# Patient Record
Sex: MALE | Race: BLACK OR AFRICAN AMERICAN | NOT HISPANIC OR LATINO | Employment: UNEMPLOYED | URBAN - METROPOLITAN AREA
[De-identification: names, ages, dates, MRNs, and addresses within clinical notes are randomized per-mention and may not be internally consistent; named-entity substitution may affect disease eponyms.]

---

## 2022-03-10 ENCOUNTER — HOSPITAL ENCOUNTER (OUTPATIENT)
Facility: CLINIC | Age: 47
Setting detail: OBSERVATION
Discharge: NURSING, REHAB OR LONG-TERM CARE | End: 2022-03-11
Attending: EMERGENCY MEDICINE | Admitting: EMERGENCY MEDICINE
Payer: MEDICAID

## 2022-03-10 VITALS
RESPIRATION RATE: 16 BRPM | TEMPERATURE: 98.6 F | BODY MASS INDEX: 27.89 KG/M2 | SYSTOLIC BLOOD PRESSURE: 135 MMHG | DIASTOLIC BLOOD PRESSURE: 80 MMHG | OXYGEN SATURATION: 98 % | WEIGHT: 167.4 LBS | HEIGHT: 65 IN | HEART RATE: 70 BPM

## 2022-03-10 DIAGNOSIS — F11.10 OPIOID ABUSE (H): ICD-10-CM

## 2022-03-10 DIAGNOSIS — F32.A DEPRESSION, UNSPECIFIED DEPRESSION TYPE: ICD-10-CM

## 2022-03-10 DIAGNOSIS — R45.851 SUICIDAL IDEATION: ICD-10-CM

## 2022-03-10 DIAGNOSIS — F15.90 STIMULANT USE DISORDER: ICD-10-CM

## 2022-03-10 DIAGNOSIS — F19.10 SUBSTANCE ABUSE (H): ICD-10-CM

## 2022-03-10 LAB
ALBUMIN SERPL-MCNC: 3.4 G/DL (ref 3.4–5)
ALP SERPL-CCNC: 89 U/L (ref 40–150)
ALT SERPL W P-5'-P-CCNC: 16 U/L (ref 0–70)
ANION GAP SERPL CALCULATED.3IONS-SCNC: 8 MMOL/L (ref 3–14)
APAP SERPL-MCNC: <2 MG/L (ref 10–30)
AST SERPL W P-5'-P-CCNC: 22 U/L (ref 0–45)
BASOPHILS # BLD AUTO: 0.1 10E3/UL (ref 0–0.2)
BASOPHILS NFR BLD AUTO: 1 %
BILIRUB SERPL-MCNC: 0.3 MG/DL (ref 0.2–1.3)
BUN SERPL-MCNC: 8 MG/DL (ref 7–30)
CALCIUM SERPL-MCNC: 8.5 MG/DL (ref 8.5–10.1)
CHLORIDE BLD-SCNC: 100 MMOL/L (ref 94–109)
CO2 SERPL-SCNC: 29 MMOL/L (ref 20–32)
CREAT SERPL-MCNC: 0.94 MG/DL (ref 0.66–1.25)
EOSINOPHIL # BLD AUTO: 0.3 10E3/UL (ref 0–0.7)
EOSINOPHIL NFR BLD AUTO: 3 %
ERYTHROCYTE [DISTWIDTH] IN BLOOD BY AUTOMATED COUNT: 14.1 % (ref 10–15)
ETHANOL SERPL-MCNC: <0.01 G/DL
GFR SERPL CREATININE-BSD FRML MDRD: >90 ML/MIN/1.73M2
GLUCOSE BLD-MCNC: 131 MG/DL (ref 70–99)
GLUCOSE BLDC GLUCOMTR-MCNC: 172 MG/DL (ref 70–99)
HCT VFR BLD AUTO: 34.1 % (ref 40–53)
HGB BLD-MCNC: 11.4 G/DL (ref 13.3–17.7)
HOLD SPECIMEN: NORMAL
IMM GRANULOCYTES # BLD: 0 10E3/UL
IMM GRANULOCYTES NFR BLD: 0 %
LACTATE SERPL-SCNC: 0.9 MMOL/L (ref 0.7–2)
LYMPHOCYTES # BLD AUTO: 2.8 10E3/UL (ref 0.8–5.3)
LYMPHOCYTES NFR BLD AUTO: 29 %
MCH RBC QN AUTO: 27.3 PG (ref 26.5–33)
MCHC RBC AUTO-ENTMCNC: 33.4 G/DL (ref 31.5–36.5)
MCV RBC AUTO: 82 FL (ref 78–100)
MONOCYTES # BLD AUTO: 0.8 10E3/UL (ref 0–1.3)
MONOCYTES NFR BLD AUTO: 8 %
NEUTROPHILS # BLD AUTO: 5.5 10E3/UL (ref 1.6–8.3)
NEUTROPHILS NFR BLD AUTO: 59 %
NRBC # BLD AUTO: 0 10E3/UL
NRBC BLD AUTO-RTO: 0 /100
PLATELET # BLD AUTO: 328 10E3/UL (ref 150–450)
POTASSIUM BLD-SCNC: 3.5 MMOL/L (ref 3.4–5.3)
PROT SERPL-MCNC: 7.5 G/DL (ref 6.8–8.8)
RBC # BLD AUTO: 4.18 10E6/UL (ref 4.4–5.9)
SARS-COV-2 RNA RESP QL NAA+PROBE: NEGATIVE
SODIUM SERPL-SCNC: 137 MMOL/L (ref 133–144)
WBC # BLD AUTO: 9.5 10E3/UL (ref 4–11)

## 2022-03-10 PROCEDURE — 85025 COMPLETE CBC W/AUTO DIFF WBC: CPT | Performed by: EMERGENCY MEDICINE

## 2022-03-10 PROCEDURE — 80143 DRUG ASSAY ACETAMINOPHEN: CPT | Performed by: EMERGENCY MEDICINE

## 2022-03-10 PROCEDURE — C9803 HOPD COVID-19 SPEC COLLECT: HCPCS

## 2022-03-10 PROCEDURE — 83605 ASSAY OF LACTIC ACID: CPT | Performed by: EMERGENCY MEDICINE

## 2022-03-10 PROCEDURE — 96360 HYDRATION IV INFUSION INIT: CPT

## 2022-03-10 PROCEDURE — 99220 PR INITIAL OBSERVATION CARE,LEVEL III: CPT | Performed by: NURSE PRACTITIONER

## 2022-03-10 PROCEDURE — 36415 COLL VENOUS BLD VENIPUNCTURE: CPT | Performed by: EMERGENCY MEDICINE

## 2022-03-10 PROCEDURE — 99285 EMERGENCY DEPT VISIT HI MDM: CPT | Mod: 25

## 2022-03-10 PROCEDURE — 80053 COMPREHEN METABOLIC PANEL: CPT | Performed by: EMERGENCY MEDICINE

## 2022-03-10 PROCEDURE — 87635 SARS-COV-2 COVID-19 AMP PRB: CPT | Performed by: EMERGENCY MEDICINE

## 2022-03-10 PROCEDURE — 82077 ASSAY SPEC XCP UR&BREATH IA: CPT | Performed by: EMERGENCY MEDICINE

## 2022-03-10 PROCEDURE — 90791 PSYCH DIAGNOSTIC EVALUATION: CPT

## 2022-03-10 PROCEDURE — 258N000003 HC RX IP 258 OP 636: Performed by: EMERGENCY MEDICINE

## 2022-03-10 PROCEDURE — G0378 HOSPITAL OBSERVATION PER HR: HCPCS

## 2022-03-10 RX ORDER — QUETIAPINE FUMARATE 50 MG/1
50 TABLET, FILM COATED ORAL 3 TIMES DAILY PRN
Status: DISCONTINUED | OUTPATIENT
Start: 2022-03-10 | End: 2022-03-11 | Stop reason: HOSPADM

## 2022-03-10 RX ADMIN — SODIUM CHLORIDE 500 ML: 9 INJECTION, SOLUTION INTRAVENOUS at 02:11

## 2022-03-10 ASSESSMENT — ENCOUNTER SYMPTOMS
CHILLS: 0
DIARRHEA: 0
FEVER: 0
CONFUSION: 1
SEIZURES: 1
VOMITING: 0
HEADACHES: 0

## 2022-03-10 NOTE — TREATMENT PLAN
"  EmPATH Treatment Plan    Client's Name: Jason Echols IV  YOB: 1975    DSM-5 Diagnoses:     311 (F32.9) Unspecified Depressive Disorder  - provisional      300.00 (F41.9) Unspecified Anxiety Disorder - provisional        Psychosocial / Contextual Factors: Patient presented to the emPATH unit with the following concerns: Patient reports yesterday he had a seizure, he states he has a history of seizures.  Patient states he is currently homeless, he said he recently left  treatment at [facility].  Patient reports he recently took 2 blue pills which he thinks were percocet.  Patient reports he has a long history of homelessness and polysubstance use, primarily meth.  Patient said he's been feeling sad and lonely with SI for the last 2 days, \"I don't want to be on this earth anymore, I'm tired of my life, I thought of jumping in front of the train\".    Anticipated number of sessions or this episode of care: 1-4    MeasurableTreatment Goal(s) related to diagnosis / functional impairment(s)    Goal: Stabilize the suicidal crisis?      Objective: Identify life factors/triggers that preceded the SI?      Patient will: express feelings related to SI in order to explore factors/triggers?      ?LMHP will: assist patient in becoming aware of life factors and triggers that may have been?      ?precursors to SI?      Objective: participate in therapy session around emotional issues resulting in suicidal thoughts?      ?Patient will: discuss feelings and emotional sources of stress, hopelessness?      ?LMHP will: encourage pt to express feelings and emotions?           Goal : Patient will engage in lethal means restriction       Objective #A        Patient will identify risk area and items in their home associate with suicidal risk and develop a plan to remove/restrict access       Intervention(s)       LMHP will facilitate a guided discussion with patient to help identify risk and promote safety  "       Objective #B       Patient will include people in their support system in their safety planning       Intervention(s)       LMHP will facilitate a safety planning session collaboratively with patient and members of their support system. This will include discussion on how to make the home safer.     Goal: Patient will increase awareness of depression symptoms and their impact on functioning and develop skills to reduce negative impact.      Objective #A       Patient will describe thoughts, feelings, and actions associated with depression.       Intervention(s)      LMHP will explore and process with patient how depression has impacted them.      Objective #B      Patient will increase depression coping skills.      Intervention(s)      LMHP will teach CBT skills and model their use.                 Appearance:   Appropriate    Eye Contact:   Fair    Psychomotor Behavior: Normal    Attitude:   Cooperative    Orientation:   All   Speech    Rate / Production: Normal/ Responsive Normal     Volume:  Normal    Mood:    Anxious  Depressed  Sad    Affect:    Appropriate  Flat    Thought Content:  Suicidal   Thought Form:  Coherent    Insight:    Fair           PLAN:   Patient will board in emPATH until patient and treatment deem it appropriate to either discharge or admit to a higher level of care.       Sharmaine [place], [place]                                                           ________

## 2022-03-10 NOTE — ED TRIAGE NOTES
"BIBA from [facility], pt approached individual and reported that he had a seizure. Pt reports hx of seizures. Patient provided different name to medics and ER staff. Pt also reports \"bad thoughts\" and taking \"2 blue pills\" [place] a [place] on the train. VSS for EMS. .  "

## 2022-03-10 NOTE — ED NOTES
Pt states he currently is diagnosed with DM. Pt denies any medications or management of his DM and states he does not need anything at this time.

## 2022-03-10 NOTE — ED NOTES
"Pt brought to Em[facility] with increased thoughts of SI and feeling more depressed. Pt states he is \"tired of being here and tired of life.\" Pt is feeling more depressed and states he is willing to talk to someone about his ongoing depression and restart on some medications. Pt doesn't recall how he came to the hospital or the events leading up to his hospitalization. Pt is feeling more depressed and does stated he feels suicidal. Pt is unable to expand upon this as he remain recluse and not forthcoming with much information. Pt does state he is DM however only takes insulin when ever he can. Pt also states he has been off his medications for over a month. Per ED staff pt also has a SZR hx and had an unwitnessed SZR yesterday. Pt does not recall this but states he has had SZRs in the past. Pt does not remember when his last SZR was prior to what was said to staff yesterday. Pt is not on any medications for SZRs. Pt is flat and depressed. Pt has poor eye contact and is not forthcoming with much information. Pt is agreeable to meet with staff and MD to talk about a plan. Nursing was not able to get an accurate BG due to pt eating breakfast up arrival to Em[facility]. Nursing to recheck BG this afternoon.  BG upon admission to ED was 131.  Nursing and risk assessments completed.  Assessments reviewed with LMHP and physician. Video monitoring in progress, patient informed.  Admission information reviewed with patient. Patient given a tour of Em[facility] and instructions on using the facility. Questions regarding [facility] addressed. Pt search completed and belongings inventoried.  "

## 2022-03-10 NOTE — ED PROVIDER NOTES
"  History   Chief Complaint:  Seizures       HPI   Jason Echols IV is a 46 year old male with history of epilepsy who presents after a seizure. Per EMS report, the patient walked up to the security desk at [facility] and stated that he had a seizure. He reports taking two blue pills from a stranger before he arrived at [facility]. He is now feeling weak and reports suicidal ideation with a plan. He denies fever, chills, vomiting, diarrhea or headache. He did have alcohol tonight.     Review of Systems   Constitutional: Negative for chills and fever.   Gastrointestinal: Negative for diarrhea and vomiting.   Neurological: Positive for seizures. Negative for headaches.   Psychiatric/Behavioral: Positive for confusion and suicidal ideas.   All other systems reviewed and are negative.      Allergies:  No Known Drug Allergies    Medications:  Zithromax    Past Medical History:     Epilepsy    Social History:  Presents alone  Smoker    Physical Exam     Patient Vitals for the past 24 hrs:   BP Temp Temp src Pulse Resp SpO2 Height Weight   03/10/22 0500 112/69 -- -- 90 -- 94 % -- --   03/10/22 0340 123/81 -- -- 93 20 93 % -- --   03/10/22 0215 -- -- -- 89 20 96 % -- --   03/10/22 0201 (!) 134/91 98.5  F (36.9  C) Oral 88 16 98 % 1.651 m (5' 5\") 81.6 kg (180 lb)       Physical Exam  Constitutional: Mildly drowsy, GCS 1 14  HENT:    Nose: Nose normal.    Mouth/Throat: Oropharynx is clear, mucous membranes are moist  Eyes: Pupils are constricted, mildly injected sclera  CV: regular rate and rhythm; no murmurs  Chest: Effort normal and breath sounds clear without wheezing or rales, symmetric bilaterally   GI:  non tender. No distension. No guarding or rebound.    MSK: No LE edema, no tenderness to palpation of BLE.  Neurological: Alert, attentive, moving all extremities equally.   Skin: Skin is warm and dry.        Emergency Department Course     Laboratory:  Labs Ordered and Resulted from Time of ED Arrival to Time of ED Departure "   COMPREHENSIVE METABOLIC PANEL - Abnormal       Result Value    Sodium 137      Potassium 3.5      Chloride 100      Carbon Dioxide (CO2) 29      Anion Gap 8      Urea Nitrogen 8      Creatinine 0.94      Calcium 8.5      Glucose 131 (*)     Alkaline Phosphatase 89      AST 22      ALT 16      Protein Total 7.5      Albumin 3.4      Bilirubin Total 0.3      GFR Estimate >90     CBC WITH PLATELETS AND DIFFERENTIAL - Abnormal    WBC Count 9.5      RBC Count 4.18 (*)     Hemoglobin 11.4 (*)     Hematocrit 34.1 (*)     MCV 82      MCH 27.3      MCHC 33.4      RDW 14.1      Platelet Count 328      % Neutrophils 59      % Lymphocytes 29      % Monocytes 8      % Eosinophils 3      % Basophils 1      % Immature Granulocytes 0      NRBCs per 100 WBC 0      Absolute Neutrophils 5.5      Absolute Lymphocytes 2.8      Absolute Monocytes 0.8      Absolute Eosinophils 0.3      Absolute Basophils 0.1      Absolute Immature Granulocytes 0.0      Absolute NRBCs 0.0     ACETAMINOPHEN LEVEL - Abnormal    Acetaminophen <2 (*)    ETHYL ALCOHOL LEVEL - Normal    Alcohol ethyl <0.01     LACTIC ACID WHOLE BLOOD - Normal    Lactic Acid 0.9     COVID-19 VIRUS (CORONAVIRUS) BY PCR - Normal    SARS CoV2 PCR Negative            Emergency Department Course:    Mental Health Risk Assessment      PSS-3    Date and Time Over the past 2 weeks have you felt down, depressed, or hopeless? Over the past 2 weeks have you had thoughts of killing yourself? Have you ever attempted to kill yourself? When did this last happen? User   03/10/22 0206 yes yes yes --       C-SSRS ([place])    Date and Time Q1 Wished to be Dead (Past Month) Q2 Suicidal Thoughts (Past Month) Q3 Suicidal Thought Method Q4 Suicidal Intent without Specific Plan Q5 Suicide Intent with Specific Plan Q6 Suicide Behavior (Lifetime) Within the Past 3 Months? RETIRED: Level of Risk per Screen Screening Not Complete User   03/10/22 0206 yes yes -- yes yes yes -- -- -- SH     "          Suicide assessment completed by mental health (D.E.C., LCSW, etc.)    Reviewed:  I reviewed nursing notes, vitals, past medical history and Care Everywhere    Assessments:  215 I obtained history and examined the patient as noted above.   530 I rechecked the patient..      Interventions:  211: NS 1L IV Bolus      Disposition:  The patient was transferred to [facility].     Impression & Plan     Medical Decision Makin-year-old male past medical history significant for substance abuse and question of possible epilepsy though I cannot find any documented in care everywhere presenting for evaluation of possible seizure.  He reportedly walked up to some of the [email or web address] reportedly had a seizure and was brought here to the emergency department.  He endorses taking 2 \"blue pills\" that he thought was oxycodone, denies any ethanol use.  On exam, he appears mildly drowsy with pinpoint pupils but is breathing on his own.  Blood ethanol level was negative.  He has no signs of trauma.  No indication for neuroimaging.  I have lower suspicion for seizure based on his story, he does not seem postictal and seems more sedate secondary to opiate abuse.  He did endorse to nursing and to medics that he is having suicidal thoughts with a plan to jump in front of train.  He was monitored here, did not require any Narcan, he cleared as expected the point he has steady gait and was transferred to [facility] for further evaluation of suicidal ideation.  He was maintained on KAYLIE hold.    Diagnosis:    ICD-10-CM    1. Substance abuse (H)  F19.10    2. Suicidal ideation  R45.851        Tin Ely MD  [employer]  4:28 AM 03/10/22       Scribe Disclosure:  I, Stacia Whitehead, am serving as a scribe at 2:11 AM on 3/10/2022 to document services personally performed by Tin Ely MD  based on my observations and the provider's statements to me.           Tin Ely, " MD  03/10/22 0650

## 2022-03-11 LAB
AMPHETAMINES UR QL SCN: ABNORMAL
BARBITURATES UR QL: ABNORMAL
BENZODIAZ UR QL: ABNORMAL
CANNABINOIDS UR QL SCN: ABNORMAL
COCAINE UR QL: ABNORMAL
OPIATES UR QL SCN: ABNORMAL
PCP UR QL SCN: ABNORMAL

## 2022-03-11 PROCEDURE — 80307 DRUG TEST PRSMV CHEM ANLYZR: CPT | Performed by: NURSE PRACTITIONER

## 2022-03-11 PROCEDURE — 99217 PR OBSERVATION CARE DISCHARGE: CPT | Performed by: PSYCHIATRY & NEUROLOGY

## 2022-03-11 PROCEDURE — G0378 HOSPITAL OBSERVATION PER HR: HCPCS

## 2022-03-11 NOTE — ED NOTES
[employer] transportation has been set up for a pick-up time of 1830. Will continue to monitor until then.

## 2022-03-11 NOTE — CONSULTS
3/11/2022    CD consult completed.  Pt was minimally engaged in evaluation, provided very little detail. Pt did not seem interested in attending [facility] treatment.   A list of options for pt was emailed to Sharmaine who is working with pt.     Recommendations:   1. Abstain from all non-prescribed mood-altering substances  2. Take all medications as prescribed  3. Enter and complete a co-occurring residential or IOP with lodging treatment program  4. Follow all recommendations upon discharge from treatment. Recommendations may include, but are not limited to: extended treatment, outpatient treatment and/or sober housing.  5. Follow all recommendations of your medical and mental health providers     Clinical Substantiation:  Patient is homeless and lacks a sober living environment. Patient lacks sober coping skills, lacks a sober peer support network, has dual issues of mental health and substance abuse. Patient's mental health issues are exacerbated by his substance use.     Referrals/Alternatives:  [facility]   Phone: [phone]  Fax: [phone]   [address] [place] [address] [address]          [place] [address]         [address] [address]   Phone: [phone]   Fax: [phone]      Pending sale to [facility] Team for Referrals  Phone: [phone]  Fax: [phone]  [facility]  Address : [address], [place] [address]  Phone : [phone]  Fax : [phone]     [facility] (IOP with lodging)  Phone: [phone]  Fax: [phone] [address] [place] [address] [address]  [place] [address]  [address] [facility] [address]  [place] [address]     [facility]   Phone: [phone]   Fax: [phone]     [facility]  Phone: [phone]  Fax: [phone]  [facility]  [address] [place] [address]        [facility] consult completed by: KIRILL Moralez.  Phone  Number: [phone]  E-mail Address: [email or web address] Mental [facility] and Addiction Services Evaluation Department  [address] [place] [address]     *Due to regulation of Title 42 of the Code of Federal Regulations (CFR) Part 2: Confidentiality laws apply to this note and the information wherein.  Thus, this note cannot be copy and pasted into any other health care staff's note nor can it be included in general medical records sent to ANY outside agency without the patient's written consent.

## 2022-03-11 NOTE — ED PROVIDER NOTES
"[facility] Unit - Initial Psychiatric Observation Note  [facility] Emergency Department  Observation Initiation Date: Mar 10, 2022    Jason Echols IV MRN: [id number]   Age: 46 year old YOB: 1975     History     Chief Complaint   Patient presents with     Seizures     HPI  Jason Echols IV is a 46 year old male with a past history notable for polysubstance abuse, diabetes and epilepsy who presented to the ED after reporting to staff at the [facility] that he had a seizure.  He reported to ED staff he was depressed and suicidal. He was medically cleared in the ED without concern for seizure activity and transferred to [facility] for psychiatric assessment.    On examination, patient is sleeping soundly.  He is difficult to keep awake long enough to engage in a meaningful conversation.  He appears to be detoxing from substances, although he does not specifiy from what. He told ED staff he tool two \"blue pills\" that he thought was oxycodone.  Patient becomes irritable the more staff try to engage with him.  He verbalizes frustration with the healthcare system recommending CD treatment when that has not helped him in the past.  He comments how he would rather \"jump in front of a bus.\"  He denies taking any prescription drugs for mental health or medical issues.  He is eating and taking in fluids.  He walks to the bathroom and falls back to sleep once he gets back to his chair.    Past Medical History  No past medical history on file.  No past surgical history on file.  BUPROPION HCL PO  IBUPROFEN PO  trimethoprim-polymyxin b (POLYTRIM) ophthalmic solution      No Known Allergies  Family History  No family history on file.  Social History   Social History     Tobacco Use     Smoking status: Current Every Day Smoker     Smokeless tobacco: Not on file   Substance Use Topics     Alcohol use: Yes     Drug use: No      Past medical history, past surgical history, medications, allergies, family history, and social " "history were reviewed with the patient. No additional pertinent items.       Review of Systems  A complete review of systems was performed with pertinent positives and negatives noted in the HPI, and all other systems negative.    Physical Examination   BP: (!) 134/91  Pulse: 88  Temp: 98.5  F (36.9  C)  Resp: 16  Height: 165.1 cm (5' 5\")  Weight: 81.6 kg (180 lb)  SpO2: 98 %    Physical Exam  General: Appears stated age.   Neuro: Alert and fully oriented. Extremities appear to demonstrate normal strength on visual inspection.   Integumentary/Skin: no rash visualized, normal color    Psychiatric Examination   Appearance: somnolent  Attitude:  uncooperative and irritable  Eye Contact:  poor   Mood:  depressed  Affect:  guarded  Speech:  mumbling  Psychomotor Behavior:  no evidence of tardive dyskinesia, dystonia, or tics and physical retardation  Thought Process:  logical  Associations:  no loose associations  Thought Content:  no evidence of psychotic thought and passive suicidal ideation present  Insight:  fair  Judgement:  fair  Oriented to:  time, person, and place  Attention Span and Concentration:  fair  Recent and Remote Memory:  fair  Language: able to name/identify objects without impairment  Fund of Knowledge: intact with awareness of current and past events    ED Course        Labs Ordered and Resulted from Time of ED Arrival to Time of ED Departure   COMPREHENSIVE METABOLIC PANEL - Abnormal       Result Value    Sodium 137      Potassium 3.5      Chloride 100      Carbon Dioxide (CO2) 29      Anion Gap 8      Urea Nitrogen 8      Creatinine 0.94      Calcium 8.5      Glucose 131 (*)     Alkaline Phosphatase 89      AST 22      ALT 16      Protein Total 7.5      Albumin 3.4      Bilirubin Total 0.3      GFR Estimate >90     CBC WITH PLATELETS AND DIFFERENTIAL - Abnormal    WBC Count 9.5      RBC Count 4.18 (*)     Hemoglobin 11.4 (*)     Hematocrit 34.1 (*)     MCV 82      MCH 27.3      MCHC 33.4      RDW " 14.1      Platelet Count 328      % Neutrophils 59      % Lymphocytes 29      % Monocytes 8      % Eosinophils 3      % Basophils 1      % Immature Granulocytes 0      NRBCs per 100 WBC 0      Absolute Neutrophils 5.5      Absolute Lymphocytes 2.8      Absolute Monocytes 0.8      Absolute Eosinophils 0.3      Absolute Basophils 0.1      Absolute Immature Granulocytes 0.0      Absolute NRBCs 0.0     ACETAMINOPHEN LEVEL - Abnormal    Acetaminophen <2 (*)    GLUCOSE BY METER - Abnormal    GLUCOSE BY METER POCT 172 (*)    ETHYL ALCOHOL LEVEL - Normal    Alcohol ethyl <0.01     LACTIC ACID WHOLE BLOOD - Normal    Lactic Acid 0.9     COVID-19 VIRUS (CORONAVIRUS) BY PCR - Normal    SARS CoV2 PCR Negative         Assessments & Plan (with Medical Decision Making)   Patient presenting with suicidal ideation, heightened depression in the context of chronic polysubstance abuse. Patient recently left a CD program and is not compliant with treatment recommendations.    Nursing notes reviewed noting no acute issues.     I have reviewed the assessment completed by the [facility].     During the observation period, the patient did not require medications for agitation, and did not require restraints/seclusion for patient and/or provider safety.     The patient was found to have a psychiatric condition that would benefit from an observation stay in the emergency department for further psychiatric stabilization and/or coordination of a safe disposition. The observation plan includes serial assessments of psychiatric condition, potential administration of medications if indicated, further disposition pending the patient's psychiatric course during the monitoring period.     Preliminary diagnosis:    ICD-10-CM    1. Substance abuse (H)  F19.10    2. Suicidal ideation  R45.851    3. Opioid abuse (H)  F11.10    4. Depression, unspecified depression type  F32.A         Treatment Plan:  -Observation status for continued symptoms monitoring.     -Will have available Seroquel 50 mg TID PRN for anxiety or sleep.  -Urine toxicology ordered.  -Patient is not interested in CD assessment or treatment.  -Reassess tomorrow with probable discharge to crisis, detox or homeless shelter.    --  AURA Wolfe CNP   [facility] EMERGENCY DEPT  EmPATH Unit  3/10/2022        Zahraa Bernardo APRN CNP  03/10/22 3617

## 2022-03-11 NOTE — ED NOTES
"Patient completed CD assessment. He walked out of the consultation room stating: \"all they want to do is send me to treatment.\" Plan is for patient to discharge to [facility] by 1900.  "
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
## 2022-03-11 NOTE — ED NOTES
"Patient remains irritable in mood. He spent the majority of the morning resting. States he feels \"much the same.\" Minimal interactions with staff and peers. He was observed helping himself to multiple courtesy meals in addition to his regular meal. Nursing will continue to monitor for safety.   "

## 2022-03-11 NOTE — DISCHARGE INSTRUCTIONS
Aftercare Plan  If I am feeling unsafe or I am in a crisis, I will:   Contact my established care providers   Call the National Suicide Prevention Lifeline: 531.578.7425   Go to the nearest emergency room   Call 344      Warning signs that I or other people might notice when a crisis is developing for me: having thoughts of harming myself or others, increased anxiety and depression      Things I am able to do on my own to cope or help me feel better: practice deep breathing exercises, practice grounding techniques, attend all appointments, take a break, go for a walk, write in journal       Things that I am able to do with others to cope or help me feel better: call supportive friends and family to talk and ask for help     Things I can use or do for distraction: take a break, go for a walk, write in a journal, take a warm bath/shower, watch TV      Changes I can make to support my mental health and wellness:   - Get a minimum of 30 minutes of self-care daily. This can be as simply as taking a shower, going for a walk, cooking a meal, read, writing, etc.   - Exercise 3-4 times weekly   - Start a gratitude journal and write down 3 things every day that you are grateful for. There are also gratitude journal apps for your smartphone that you can download for free if using your phone.   - Download a meditation meaghan and spend 15-20 minutes per day mediating/relaxing. Some apps to download include: Calm, Headspace and Insight Timer. All 3 of these apps have a free version.     People in my life that I can ask for help: family, friends      Your [facility] has a mental health crisis team you can call 24/7: [facility] Mobile Crisis  [phone] (adults)  [phone] (children)     Other things that are important when I m in crisis: Remember help is available, follow up with established providers, attend all appointments, take medications as prescribed        Additional resources and information: Behavioral Healthcare  "Providers (B[facility]) Coordinators to follow up with you with 1-2 days to ensure you have all of the resources that you need. You may also call Behavioral Healthcare Providers (BHP) Coordinators at [phone] if you have any questions or if any additional resources are needed and one of our coordinators can assist you.       Crisis Lines  Crisis Text Line  Text 030210  You will be connected with a trained live crisis counselor to provide support.     Por espanol, texto  SPEEDY a 853601 o texto a 442-AYUDAME en WhatsApp     National Hope Line  1.800.SUICIDE [3688417]        Community Resources  Fast Tracker  Linking people to mental health and substance use disorder resources  Movatun.org      [place] [facility] Warm Line  Peer to peer support  Monday thru Saturday, 12 pm to 10 pm  869.945.4214 or 3.993.835.5005  Text \"Support\" to [address]120     National [place] on Mental Illness (JOYCE)  597.515.6214 or 1.888.JOYCE.HELPS           Mental Health Apps  My3  https://Fast Track Asiapp.org/     VirtualHopeBox  https://Deposco.org/apps/virtual-hope-box/    [facility]    Phone: [phone]    Fax: [phone]    [address] [place] [address] [address]            [place] [address]             [address] [address]    Phone: [phone]    Fax: [phone]         [facility] Team for Referrals    Phone: [phone]    Fax: [phone]    [facility]    Address : [address], [place] [address]    Phone : [phone]    Fax : [phone]         [facility] (IOP with lodging)    Phone: [phone]    Fax: [phone] [address]    [place] [address]    [address]    [place] [address]         [facility]    Phone: [phone]    Fax: [phone]         [facility]    Phone: [phone]    Fax: " [phone]    [facility] [address] [place] [address]

## 2022-03-11 NOTE — ED PROVIDER NOTES
"EmPATH Unit - Psychiatric Observation Discharge Summary  [facility] Emergency Department  Discharge Date: 3/11/2022    Jason Echols IV MRN: [id number]   Age: 46 year old YOB: 1975     Brief HPI & Initial ED Course     Chief Complaint   Patient presents with     Seizures     HPI  Jason Echols IV is a 46 year old male with history notable for substance use disorders involving opiates and methamphetamines with past documentation identifying an unspecified depressive disorder and likely antisocial personality disorder.  He is currently under observation status on the EmPATH unit.  Overnight, there were no acute issues.  The patient is reported to have been sleeping the majority of the time while eating his meals and getting up to go to the restroom as needed.  On reassessment today, the patient reports that he feels slightly better.  He continues to feel fatigued while highlighting low energy and mild to moderate depressive symptoms.  He is not seeking any particular psychotropic medication to address the symptoms.  His primary stressor is homelessness.  He is seeking resources to help promote ongoing mental health treatments in addition to resources to address homelessness.  He denied active suicidal or homicidal thoughts.  There was no indication of psychosis.        Physical Examination   BP: 135/80  Pulse: 70  Temp: 98.6  F (37  C)  Resp: 16  Height: 165.1 cm (5' 5\")  Weight: 75.9 kg (167 lb 6.4 oz)  SpO2: 98 %    Physical Exam  General: Appears stated age.   Neuro: Alert and fully oriented. Extremities appear to demonstrate normal strength on visual inspection.   Integumentary/Skin: no rash visualized, normal color    Psychiatric Examination   Appearance: awake, alert  Attitude:  cooperative  Eye Contact:  fair  Mood:  sad  and better  Affect:  intensity is blunted  Speech:  clear, coherent  Psychomotor Behavior:  no evidence of tardive dyskinesia, dystonia, or tics  Thought Process:  " logical and linear  Associations:  no loose associations  Thought Content:  no evidence of suicidal ideation or homicidal ideation and no evidence of psychotic thought  Insight:  fair  Judgement:  intact  Oriented to:  time, person, and place  Attention Span and Concentration:  intact  Recent and Remote Memory:  fair  Language: able to name/identify objects without impairment  Fund of Knowledge: intact with awareness of current and past events    Results        Labs Ordered and Resulted from Time of ED Arrival to Time of ED Departure   COMPREHENSIVE METABOLIC PANEL - Abnormal       Result Value    Sodium 137      Potassium 3.5      Chloride 100      Carbon Dioxide (CO2) 29      Anion Gap 8      Urea Nitrogen 8      Creatinine 0.94      Calcium 8.5      Glucose 131 (*)     Alkaline Phosphatase 89      AST 22      ALT 16      Protein Total 7.5      Albumin 3.4      Bilirubin Total 0.3      GFR Estimate >90     CBC WITH PLATELETS AND DIFFERENTIAL - Abnormal    WBC Count 9.5      RBC Count 4.18 (*)     Hemoglobin 11.4 (*)     Hematocrit 34.1 (*)     MCV 82      MCH 27.3      MCHC 33.4      RDW 14.1      Platelet Count 328      % Neutrophils 59      % Lymphocytes 29      % Monocytes 8      % Eosinophils 3      % Basophils 1      % Immature Granulocytes 0      NRBCs per 100 WBC 0      Absolute Neutrophils 5.5      Absolute Lymphocytes 2.8      Absolute Monocytes 0.8      Absolute Eosinophils 0.3      Absolute Basophils 0.1      Absolute Immature Granulocytes 0.0      Absolute NRBCs 0.0     ACETAMINOPHEN LEVEL - Abnormal    Acetaminophen <2 (*)    GLUCOSE BY METER - Abnormal    GLUCOSE BY METER POCT 172 (*)    DRUG ABUSE SCREEN 77 URINE (FL, RH, SH) - Abnormal    Amphetamines Urine Screen Positive (*)     Barbiturates Urine Screen Negative      Benzodiazepines Urine Screen Negative      Cannabinoids Urine Screen Positive (*)     Cocaine Urine Screen Positive (*)     Opiates Urine Screen Negative      PCP Urine Screen  Negative     ETHYL ALCOHOL LEVEL - Normal    Alcohol ethyl <0.01     LACTIC ACID WHOLE BLOOD - Normal    Lactic Acid 0.9     COVID-19 VIRUS (CORONAVIRUS) BY PCR - Normal    SARS CoV2 PCR Negative         Observation Course   The patient was found to have a psychiatric condition that would benefit from an observation stay in the emergency department for further psychiatric stabilization and/or coordination of a safe disposition. The plan upon observation admission included serial assessments of psychiatric condition, potential administration of medications if indicated, further disposition pending the patient's psychiatric course during the monitoring period.     Serial assessments of the patient's psychiatric condition were performed. Nursing notes were reviewed. During the observation period, the patient did not require medications for agitation, and did not require restraints/seclusion for patient and/or provider safety.     After a period of working with the treatment team on the EmPATH unit, the patient's mental state improved to allow a safe transition to outpatient care. After counseling on the diagnosis, work-up, and treatment plan, the patient was discharged. Close follow-up with a psychiatrist and/or therapist was recommended and community psychiatric resources were provided. Patient is to return to the ED if any urgent or potentially life-threatening concerns.     Discharge Diagnoses:   Final diagnoses:   Substance abuse (H)   Suicidal ideation   Opioid abuse (H)   Depression, unspecified depression type   Stimulant use disorder       Treatment Plan:  -Depressive symptoms may be related to substance usage and withdrawal versus psychosocial stressors stemming from homelessness.  At this time, he is not interested in utilizing antidepressant medications.  His primary mode of treatment will be psychotherapy.  -Urine drug screen was reviewed and noted to be positive for amphetamines, cocaine, and cannabis.  He  is currently not experiencing any withdrawal symptoms that require medical management.  Vital signs are currently stable.  -A chemical health assessment will be ordered to aid in exploring substance use disorder treatments  -Transition to a crisis facility today.      --  Mariusz Vuong MD  [facility] EMERGENCY DEPT  EmPATH Unit  3/11/2022      Mariusz Vuong MD  03/11/22 3993

## 2022-03-11 NOTE — ED NOTES
Pt continues to sleep in recliner. Pt got up several times during the night and had a few snacks. Will continue to monitor.

## 2022-03-11 NOTE — ED NOTES
"Patient is A&O, mood is irritable, with flat affect. Patient has been visible on the unit and minimally social. Did not engage much in conversation with staff. Patient became argumentative when provider asked about his substance use history and patient commented on how \"everybody just want to send me to treatment.\" Patient spent the majority of the evening resting in chair, but he made statements about how tired he is. Patient did not make any suicidal statements. Declined blood glucoses checks. States his diabetes is diet controlled.  "

## 2022-03-12 NOTE — ED NOTES
Patient agreeable to discharge plan. Discharge instructions reviewed with patient including follow-up care plan. Medications: none were prescribed at this visit. Reviewed safety plan and outpatient resources. Denies SI and HI. All belongings that were brought into the hospital have been returned to patient. Escorted off the unit at 1835 accompanied by [facility] staff. Discharged to [facility] via [employer] services.

## 2024-11-01 ENCOUNTER — ANCILLARY PROCEDURE (OUTPATIENT)
Dept: GENERAL RADIOLOGY | Facility: CLINIC | Age: 49
End: 2024-11-01
Attending: FAMILY MEDICINE
Payer: MEDICAID

## 2024-11-01 ENCOUNTER — OFFICE VISIT (OUTPATIENT)
Dept: FAMILY MEDICINE | Facility: CLINIC | Age: 49
End: 2024-11-01
Payer: MEDICAID

## 2024-11-01 VITALS
HEART RATE: 98 BPM | OXYGEN SATURATION: 99 % | BODY MASS INDEX: 28.32 KG/M2 | SYSTOLIC BLOOD PRESSURE: 127 MMHG | WEIGHT: 170 LBS | TEMPERATURE: 98 F | DIASTOLIC BLOOD PRESSURE: 74 MMHG | RESPIRATION RATE: 18 BRPM | HEIGHT: 65 IN

## 2024-11-01 DIAGNOSIS — G89.29 CHRONIC PAIN OF LEFT KNEE: ICD-10-CM

## 2024-11-01 DIAGNOSIS — M25.562 CHRONIC PAIN OF LEFT KNEE: ICD-10-CM

## 2024-11-01 DIAGNOSIS — F15.11 METHAMPHETAMINE ABUSE IN REMISSION (H): ICD-10-CM

## 2024-11-01 DIAGNOSIS — L84 CALLUS OF FOOT: ICD-10-CM

## 2024-11-01 DIAGNOSIS — Z76.89 ENCOUNTER TO ESTABLISH CARE: Primary | ICD-10-CM

## 2024-11-01 DIAGNOSIS — G89.18 ACUTE POST-OPERATIVE PAIN: ICD-10-CM

## 2024-11-01 DIAGNOSIS — F14.11 COCAINE ABUSE IN REMISSION (H): ICD-10-CM

## 2024-11-01 DIAGNOSIS — E11.9 TYPE 2 DIABETES MELLITUS WITHOUT COMPLICATION, WITHOUT LONG-TERM CURRENT USE OF INSULIN (H): ICD-10-CM

## 2024-11-01 DIAGNOSIS — Z11.59 NEED FOR HEPATITIS C SCREENING TEST: ICD-10-CM

## 2024-11-01 DIAGNOSIS — Z11.4 SCREENING FOR HIV (HUMAN IMMUNODEFICIENCY VIRUS): ICD-10-CM

## 2024-11-01 DIAGNOSIS — Z12.11 SCREEN FOR COLON CANCER: ICD-10-CM

## 2024-11-01 PROCEDURE — 73562 X-RAY EXAM OF KNEE 3: CPT | Mod: TC | Performed by: RADIOLOGY

## 2024-11-01 PROCEDURE — 99204 OFFICE O/P NEW MOD 45 MIN: CPT | Performed by: FAMILY MEDICINE

## 2024-11-01 RX ORDER — OXYCODONE HYDROCHLORIDE 5 MG/1
5 TABLET ORAL EVERY 8 HOURS PRN
Qty: 12 TABLET | Refills: 0 | Status: SHIPPED | OUTPATIENT
Start: 2024-11-01

## 2024-11-01 RX ORDER — UREA 200 MG/G
CREAM TOPICAL PRN
Qty: 480 G | Refills: 11 | Status: SHIPPED | OUTPATIENT
Start: 2024-11-01 | End: 2024-11-01

## 2024-11-01 RX ORDER — OXYCODONE HYDROCHLORIDE 5 MG/1
5 TABLET ORAL EVERY 8 HOURS PRN
Qty: 12 TABLET | Refills: 0 | Status: SHIPPED | OUTPATIENT
Start: 2024-11-01 | End: 2024-11-01

## 2024-11-01 RX ORDER — UREA 200 MG/G
CREAM TOPICAL PRN
Qty: 480 G | Refills: 11 | Status: SHIPPED | OUTPATIENT
Start: 2024-11-01

## 2024-11-01 NOTE — TELEPHONE ENCOUNTER
His insurance won't cover his medications here. Can you please send them to [facility].   Thank you   Aleja Kennedy. Pharmacy Technician   [facility]

## 2024-11-01 NOTE — LETTER
November 4, 2024    To  Jason PAGE Echols IV  [address] [facility] [address]    Your team at [facility] cares about your health. We have reviewed your chart and based on our findings; we are making the following recommendations to better manage your health.     You are in particular need of attention regarding the following:     We have sent your prescriptions to [facility] due to your insurance being able to cover these there. Please call us with any questions.       If you have already completed these items, please contact the clinic via phone or   [employer] so your care team can review and update your records. Thank you for   choosing [facility] for your healthcare needs. For any questions,   concerns, or to schedule an appointment please contact our clinic.    Healthy Regards,      Your [facility] Care Team
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
## 2024-11-04 NOTE — TELEPHONE ENCOUNTER
Called patient and unable to leave message, sending letter.       Darlyn RAO CMA ([facility])

## 2025-01-01 ENCOUNTER — LAB REQUISITION (OUTPATIENT)
Dept: LAB | Facility: CLINIC | Age: 50
End: 2025-01-01

## 2025-01-01 PROCEDURE — 88312 SPECIAL STAINS GROUP 1: CPT | Mod: TC | Performed by: PATHOLOGY

## 2025-01-21 ENCOUNTER — HOSPITAL ENCOUNTER (EMERGENCY)
Facility: HOSPITAL | Age: 50
Discharge: STILL ADMITTED OR ADMINISTRATIVE | End: 2025-01-22
Payer: MEDICAID
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
## 2025-02-25 ENCOUNTER — PATIENT OUTREACH (OUTPATIENT)
Dept: OTOLARYNGOLOGY | Facility: CLINIC | Age: 50
End: 2025-02-25
Payer: MEDICAID

## 2025-02-25 NOTE — PROGRESS NOTES
Faxed referral information to  to help with coordination after release from facility. Shelbie Mckeon RN on 2/25/2025 at 9:40 AM